# Patient Record
Sex: FEMALE | Race: WHITE | NOT HISPANIC OR LATINO | Employment: OTHER | URBAN - METROPOLITAN AREA
[De-identification: names, ages, dates, MRNs, and addresses within clinical notes are randomized per-mention and may not be internally consistent; named-entity substitution may affect disease eponyms.]

---

## 2019-11-05 ENCOUNTER — TELEPHONE (OUTPATIENT)
Dept: UROLOGY | Facility: CLINIC | Age: 84
End: 2019-11-05

## 2019-11-05 NOTE — TELEPHONE ENCOUNTER
----- Message from Joycelyn Suh sent at 11/4/2019 10:10 AM CST -----  Contact: pt  Type:  Sooner Apoointment Request    Caller is requesting a sooner appointment.  Caller declined first available appointment listed below.  Caller will not accept being placed on the waitlist and is requesting a message be sent to doctor.  Name of Caller: ROLO WILLIS   When is the first available appointment? 11/21/2019  Symptoms: bladder  Would the patient rather a call back or a response via [facility]? Call  Best Call Back Number:[phone] (home) or [phone]  Additional Information: Pt would like a sooner appt. Before 11/21/2019. Please call back [phone]

## 2019-11-07 ENCOUNTER — OFFICE VISIT (OUTPATIENT)
Dept: UROLOGY | Facility: CLINIC | Age: 84
End: 2019-11-07
Payer: MEDICARE

## 2019-11-07 VITALS
RESPIRATION RATE: 18 BRPM | HEIGHT: 63 IN | DIASTOLIC BLOOD PRESSURE: 75 MMHG | HEART RATE: 82 BPM | SYSTOLIC BLOOD PRESSURE: 111 MMHG | BODY MASS INDEX: 32.25 KG/M2 | WEIGHT: 182 LBS

## 2019-11-07 DIAGNOSIS — N39.0 RECURRENT UTI (URINARY TRACT INFECTION): ICD-10-CM

## 2019-11-07 DIAGNOSIS — R10.2 VAGINAL PAIN: Primary | ICD-10-CM

## 2019-11-07 LAB
BILIRUB UR QL STRIP: NEGATIVE
GLUCOSE UR QL STRIP: NEGATIVE
KETONES UR QL STRIP: NEGATIVE
LEUKOCYTE ESTERASE UR QL STRIP: POSITIVE
PH, POC UA: 6.5
POC AMORP, URINE: ABNORMAL
POC BACTI, URINE: ABNORMAL
POC BLOOD, URINE: POSITIVE
POC CASTS, URINE: ABNORMAL
POC CRYST, URINE: ABNORMAL
POC EPITH, URINE: ABNORMAL
POC HCG, URINE: ABNORMAL
POC HYALIN, URINE: ABNORMAL LPF
POC MUCUS, URINE: ABNORMAL
POC NITRATES, URINE: POSITIVE
POC OTHER, URINE: ABNORMAL
POC RBC, URINE: ABNORMAL HPF
POC WBC, URINE: >100 HPF
PROT UR QL STRIP: POSITIVE
SP GR UR STRIP: >=1.03 (ref 1–1.03)
UROBILINOGEN UR STRIP-ACNC: 2 (ref 0.1–1.1)

## 2019-11-07 PROCEDURE — 81000 POCT URINALYSIS (W/MICRO OPTION): ICD-10-PCS | Mod: S$GLB,,, | Performed by: SPECIALIST

## 2019-11-07 PROCEDURE — 81000 URINALYSIS NONAUTO W/SCOPE: CPT | Mod: S$GLB,,, | Performed by: SPECIALIST

## 2019-11-07 PROCEDURE — 1101F PR PT FALLS ASSESS DOC 0-1 FALLS W/OUT INJ PAST YR: ICD-10-PCS | Mod: CPTII,S$GLB,, | Performed by: SPECIALIST

## 2019-11-07 PROCEDURE — 99204 PR OFFICE/OUTPT VISIT, NEW, LEVL IV, 45-59 MIN: ICD-10-PCS | Mod: 25,S$GLB,, | Performed by: SPECIALIST

## 2019-11-07 PROCEDURE — 99204 OFFICE O/P NEW MOD 45 MIN: CPT | Mod: 25,S$GLB,, | Performed by: SPECIALIST

## 2019-11-07 PROCEDURE — 1101F PT FALLS ASSESS-DOCD LE1/YR: CPT | Mod: CPTII,S$GLB,, | Performed by: SPECIALIST

## 2019-11-07 RX ORDER — ACARBOSE 25 MG/1
25 TABLET ORAL
COMMUNITY

## 2019-11-07 RX ORDER — CEPHALEXIN 250 MG/1
250 CAPSULE ORAL 4 TIMES DAILY
COMMUNITY
End: 2020-05-15

## 2019-11-07 RX ORDER — SERTRALINE HYDROCHLORIDE 25 MG/1
TABLET, FILM COATED ORAL
COMMUNITY
Start: 2019-10-05

## 2019-11-07 RX ORDER — METOPROLOL TARTRATE 100 MG/1
TABLET ORAL
COMMUNITY
Start: 2019-10-07 | End: 2020-05-15

## 2019-11-07 RX ORDER — VIT C/E/ZN/COPPR/LUTEIN/ZEAXAN 250MG-90MG
1000 CAPSULE ORAL DAILY
COMMUNITY

## 2019-11-07 RX ORDER — LEVETIRACETAM 500 MG/1
TABLET ORAL
COMMUNITY
Start: 2019-09-12

## 2019-11-07 RX ORDER — APIXABAN 2.5 MG/1
TABLET, FILM COATED ORAL
COMMUNITY
Start: 2019-11-04

## 2019-11-07 NOTE — PROGRESS NOTES
Subjective:       Patient ID: Cherie Hunt is a 84 y.o. female.    Chief Complaint: Other (bladder pain/spasm); Urinary Incontinence (cant hold urine); and Hematuria (can see blood in urine )      HPI:  84-year-old female referred to us for lower urinary tract symptoms.    Patient is reporting lot of pain in the vaginal area she says it hurts irrespective of whether she is voiding or not.  She denies any suprapubic pain.  Associated symptoms include inability to hold urine or urgency incontinence she has nocturia multiple times daily frequency more than 8 times.    She is also reporting that she has seen blood in her urine she recently was imaged in a hospital in [facility].  I am not sure what modality of imaging was obtained.     Upon catheterization today there was p.o. yellowish purulent urine draining from the bladder this is very concerning to me.    She denies any fevers or chills.    Past Medical History:   Past Medical History:   Diagnosis Date    A-fib     Hypertension     Hypothyroidism     Seizures        Past Surgical Historical:   Past Surgical History:   Procedure Laterality Date    BLADDER SURGERY      bladder tact X2    HERNIA REPAIR      TONSILLECTOMY          Medications:   Medication List with Changes/Refills   Current Medications    ACARBOSE (PRECOSE) 25 MG TAB    Take 25 mg by mouth 3 (three) times daily with meals.    CEPHALEXIN (KEFLEX) 250 MG CAPSULE    Take 250 mg by mouth 4 (four) times daily.    CHOLECALCIFEROL, VITAMIN D3, (VITAMIN D3) 1,000 UNIT CAPSULE    Take 1,000 Units by mouth once daily.    ELIQUIS 2.5 MG TAB        LEVETIRACETAM (KEPPRA) 500 MG TAB        METOPROLOL TARTRATE (LOPRESSOR) 100 MG TABLET        SERTRALINE (ZOLOFT) 25 MG TABLET            Past Social History:   Social History     Socioeconomic History    Marital status:      Spouse name: Not on file    Number of children: Not on file    Years of education: Not on file    Highest education level: Not  on file   Occupational History    Not on file   Social Needs    Financial resource strain: Not on file    Food insecurity:     Worry: Not on file     Inability: Not on file    Transportation needs:     Medical: Not on file     Non-medical: Not on file   Tobacco Use    Smoking status: Never Smoker    Smokeless tobacco: Never Used   Substance and Sexual Activity    Alcohol use: Not Currently    Drug use: Never    Sexual activity: Not on file   Lifestyle    Physical activity:     Days per week: Not on file     Minutes per session: Not on file    Stress: Not on file   Relationships    Social connections:     Talks on phone: Not on file     Gets together: Not on file     Attends Buddhist service: Not on file     Active member of club or organization: Not on file     Attends meetings of clubs or organizations: Not on file     Relationship status: Not on file   Other Topics Concern    Not on file   Social History Narrative    Not on file       Allergies: Review of patient's allergies indicates:  No Known Allergies     Family History:   Family History   Problem Relation Age of Onset    Seizures Mother     Cancer Other     Breast cancer Other         Review of Systems:  Review of Systems - General ROS: negative  Psychological ROS: negative  Ophthalmic ROS: negative  ENT ROS: negative  Allergy and Immunology ROS: negative  Hematological and Lymphatic ROS: negative  Endocrine ROS: negative  Respiratory ROS: no cough, shortness of breath, or wheezing  Cardiovascular ROS: no chest pain or dyspnea on exertion  Gastrointestinal ROS: no abdominal pain, change in bowel habits, or black or bloody stools  Genito-Urinary ROS: positive for - LUTS  Musculoskeletal ROS: negative  Neurological ROS: no TIA or stroke symptoms  Dermatological ROS: negative     Physical Exam:  General Appearance:    Alert, cooperative, no distress, appears stated age   Head:    Normocephalic, without obvious abnormality, atraumatic   Eyes:     PERRL, conjunctiva/corneas clear, EOM's intact, fundi     benign, both eyes   Ears:    Normal TM's and external ear canals, both ears   Nose:   Nares normal, septum midline, mucosa normal, no drainage    or sinus tenderness   Throat:   Lips, mucosa, and tongue normal; teeth and gums normal   Neck:   Supple, symmetrical, trachea midline, no adenopathy;     thyroid:  no enlargement/tenderness/nodules; no carotid    bruit or JVD   Back:     Symmetric, no curvature, ROM normal, no CVA tenderness   Lungs:     Clear to auscultation bilaterally, respirations unlabored   Chest Wall:    No tenderness or deformity    Heart:    Regular rate and rhythm, S1 and S2 normal, no murmur, rub   or gallop   Breast Exam:    No tenderness, masses, or nipple abnormality   Abdomen:     Soft, non-tender, bowel sounds active all four quadrants,     no masses, no organomegaly   Genitalia:    Normal female without lesion, discharge or tenderness    Pelvic exam revealed Richard vaginal mucosa no discharge very narrowed introitus.  No external lesions on external genitalia no lesions on the anterior vaginal wall immediate catheterization revealed yellowish purulent urine.   Rectal:    deferred   Extremities:   Extremities normal, atraumatic, no cyanosis or edema   Pulses:   2+ and symmetric all extremities   Skin:   Skin color, texture, turgor normal, no rashes or lesions   Lymph nodes:   Cervical, supraclavicular, and axillary nodes normal   Neurologic:   CNII-XII intact, normal strength, sensation and reflexes     throughout         Assessment/Plan:       84-year-old female with abnormal findings on physical exam suggestive of a bladder process.    1.  Culture urine today and treat accordingly  2.  Give a trial of either peridium all uribel for few days for  3.  Schedule her for office cystoscopy   4.  Her the daughter will  the disc from the outside facility and bring it to me for my review.    Problem List Items Addressed This Visit         Renal/    Recurrent UTI (urinary tract infection)    Relevant Orders    POCT Urinalysis (w/Micro Option) (Completed)    Urine culture       GI    Vaginal pain - Primary    Relevant Orders    Urine culture

## 2019-11-08 ENCOUNTER — TELEPHONE (OUTPATIENT)
Dept: UROLOGY | Facility: CLINIC | Age: 84
End: 2019-11-08

## 2019-11-08 NOTE — TELEPHONE ENCOUNTER
----- Message from Beverley Ernst sent at 11/8/2019  1:55 PM CST -----  Contact: [facility] records dept  duplicate message regarding what exact records are needed....[phone]

## 2019-11-08 NOTE — TELEPHONE ENCOUNTER
Spoke with pt Dr. Nava is calling in RX to pharmacy in [place]. Also informed pt she is supposed to  disk from [facility] and she understood and said she would get it and drop off during business hours. HM

## 2019-11-11 RX ORDER — SULFAMETHOXAZOLE AND TRIMETHOPRIM 800; 160 MG/1; MG/1
1 TABLET ORAL 2 TIMES DAILY
Qty: 14 TABLET | Refills: 0 | Status: SHIPPED | OUTPATIENT
Start: 2019-11-11 | End: 2020-05-15

## 2019-11-12 ENCOUNTER — TELEPHONE (OUTPATIENT)
Dept: UROLOGY | Facility: CLINIC | Age: 84
End: 2019-11-12

## 2019-11-14 ENCOUNTER — TELEPHONE (OUTPATIENT)
Dept: UROLOGY | Facility: CLINIC | Age: 84
End: 2019-11-14

## 2019-12-02 ENCOUNTER — PROCEDURE VISIT (OUTPATIENT)
Dept: UROLOGY | Facility: CLINIC | Age: 84
End: 2019-12-02
Payer: MEDICARE

## 2019-12-02 VITALS
OXYGEN SATURATION: 98 % | DIASTOLIC BLOOD PRESSURE: 82 MMHG | SYSTOLIC BLOOD PRESSURE: 126 MMHG | RESPIRATION RATE: 16 BRPM | WEIGHT: 187 LBS | HEIGHT: 63 IN | HEART RATE: 74 BPM | BODY MASS INDEX: 33.13 KG/M2

## 2019-12-02 DIAGNOSIS — R31.0 HEMATURIA, GROSS: Primary | ICD-10-CM

## 2019-12-02 DIAGNOSIS — N39.0 RECURRENT UTI (URINARY TRACT INFECTION): ICD-10-CM

## 2019-12-02 LAB
ANION GAP SERPL CALC-SCNC: 4 MMOL/L (ref 3–11)
BUN SERPL-MCNC: 22 MG/DL (ref 7–18)
CALCIUM SERPL-MCNC: 9.2 MG/DL (ref 8.5–10.1)
CHLORIDE SERPL-SCNC: 106 MMOL/L (ref 98–107)
CO2 SERPL-SCNC: 30 MMOL/L (ref 21–32)
CREAT SERPL-MCNC: 0.98 MG/DL (ref 0.55–1.02)
GFR ESTIMATION: 54
GLUCOSE SERPL-MCNC: 98 MG/DL (ref 74–106)
POTASSIUM SERPL-SCNC: 4.1 MMOL/L (ref 3.5–5.1)
SODIUM BLD-SCNC: 140 MMOL/L (ref 131–143)

## 2019-12-02 PROCEDURE — 52000 CYSTOSCOPY: ICD-10-PCS | Mod: S$GLB,,, | Performed by: SPECIALIST

## 2019-12-02 PROCEDURE — 52000 CYSTOURETHROSCOPY: CPT | Mod: S$GLB,,, | Performed by: SPECIALIST

## 2019-12-02 RX ORDER — CIPROFLOXACIN 500 MG/1
500 TABLET ORAL
Status: COMPLETED | OUTPATIENT
Start: 2019-12-02 | End: 2019-12-02

## 2019-12-02 RX ORDER — ESTRADIOL 0.1 MG/G
CREAM VAGINAL
Qty: 54 G | Refills: 0 | Status: SHIPPED | OUTPATIENT
Start: 2019-12-02 | End: 2020-03-01

## 2019-12-02 RX ADMIN — CIPROFLOXACIN 500 MG: 500 TABLET ORAL at 10:12

## 2019-12-02 NOTE — PATIENT INSTRUCTIONS
Cystoscopy    Cystoscopy is a procedure that lets your doctor look directly inside your urethra and bladder. It can be used to:  · Help diagnose a problem with your urethra, bladder, or kidneys.  · Take a sample (biopsy) of bladder or urethral tissue.  · Treat certain problems (such as removing kidney stones).  · Place a stent to bypass an obstruction.  · Take special X-rays of the kidneys.  Based on the findings, your doctor may recommend other tests or treatments.  What is a cystoscope?  A cystoscope is a telescope-like instrument that contains lenses and fiberoptics (small glass wires that make bright light). The cystoscope may be straight and rigid, or flexible to bend around curves in the urethra. The doctor may look directly into the cystoscope, or project the image onto a monitor.  Getting ready  · Ask your doctor if you should stop taking any medicines before the procedure.  · Ask whether you should avoid eating or drinking anything after midnight before the procedure.  · Follow any other instructions your doctor gives you.  Tell your doctor before the exam if you:  · Take any medicines, such as aspirin or blood thinners  · Have allergies to any medicines  · Are pregnant   The procedure  Cystoscopy is done in the doctors office, surgery center, or hospital. The doctor and a nurse are present during the procedure. It takes only a few minutes, longer if a biopsy, X-ray, or treatment needs to be done.  During the procedure:  · You lie on an exam table on your back, knees bent and legs apart. You are covered with a drape.  · Your urethra and the area around it are washed. Anesthetic jelly may be applied to numb the urethra. Other pain medicine is usually not needed. In some cases, you may be offered a mild sedative to help you relax. If a more extensive procedure is to be done, such as a biopsy or kidney stone removal, general anesthesia may be needed.  · The cystoscope is inserted. A sterile fluid is put  into the bladder to expand it. You may feel pressure from this fluid.  · When the procedure is done, the cystoscope is removed.  After the procedure  If you had a sedative, general anesthesia, or spinal anesthesia, you must have someone drive you home. Once youre home:  · Drink plenty of fluids.  · You may have burning or light bleeding when you urinate--this is normal.  · Medicines may be prescribed to ease any discomfort or prevent infection. Take these as directed.  · Call your doctor if you have heavy bleeding or blood clots, burning that lasts more than a day, a fever over 100°F  (38° C), or trouble urinating.  Date Last Reviewed: 1/1/2017  © 5771-1955 The "[employer]. [address] [place] [address], [place] [address]. All rights reserved. This information is not intended as a substitute for professional medical care. Always follow your healthcare professional's instructions.

## 2019-12-03 NOTE — PROCEDURES
"Cystoscopy  Date/Time: 12/2/2019 9:20 AM  Performed by: Christ Nava MD  Authorized by: Christ Nava MD     Consent Done?:  Yes (Written)  Time out: Immediately prior to procedure a "time out" was called to verify the correct patient, procedure, equipment, support staff and site/side marked as required.    Position:  Dorsal lithotomy  Anesthesia:  Intraurethral instillation  Preparation: Patient was prepped and draped in usual sterile fashion      Scope type:  Rigid cystoscope  External exam performed: External genitalia consistent with age high suspicion of atrophic vaginitis.    Urethra normal: Yes  Bladder neck normal: Bladder neck normal   Normal bladder: There was some bloody discharge identified at the trigonal area I think it was very related to the right ureteral orifice.        Patient tolerance:  Patient tolerated the procedure well with no immediate complications     There was effluxing of sarah reddish urine from the right ureteral orifice.  She will need upper tract imaging with IV contrast.  So for today we will obtain a BMP p.m. in the BMPs adequate and in terms of her renal function it which are order a CT urogram.      I am going to start her on estrogen vaginal therapy and Urimar T as needed for dysuria.  Patient did respond very well to the Uribel that I gave her at the last visit.      "

## 2020-02-21 ENCOUNTER — OFFICE VISIT (OUTPATIENT)
Dept: UROLOGY | Facility: CLINIC | Age: 85
End: 2020-02-21
Payer: MEDICARE

## 2020-02-21 VITALS
RESPIRATION RATE: 17 BRPM | WEIGHT: 187 LBS | BODY MASS INDEX: 33.13 KG/M2 | HEART RATE: 87 BPM | SYSTOLIC BLOOD PRESSURE: 132 MMHG | DIASTOLIC BLOOD PRESSURE: 87 MMHG | HEIGHT: 63 IN

## 2020-02-21 DIAGNOSIS — N95.2 ATROPHIC VAGINITIS: ICD-10-CM

## 2020-02-21 DIAGNOSIS — R30.0 DYSURIA: ICD-10-CM

## 2020-02-21 DIAGNOSIS — R31.0 GROSS HEMATURIA: Primary | ICD-10-CM

## 2020-02-21 LAB
BILIRUB UR QL STRIP: NEGATIVE
GLUCOSE UR QL STRIP: NEGATIVE
KETONES UR QL STRIP: NEGATIVE
LEUKOCYTE ESTERASE UR QL STRIP: POSITIVE
PH, POC UA: 5.5
POC AMORP, URINE: 0
POC BACTI, URINE: ABNORMAL
POC BLOOD, URINE: POSITIVE
POC CASTS, URINE: 0
POC CRYST, URINE: 0
POC EPITH, URINE: ABNORMAL
POC HCG, URINE: ABNORMAL
POC HYALIN, URINE: 0 LPF
POC MUCUS, URINE: 0
POC NITRATES, URINE: NEGATIVE
POC OTHER, URINE: 0
POC RBC, URINE: ABNORMAL HPF
POC WBC, URINE: ABNORMAL HPF
PROT UR QL STRIP: POSITIVE
SP GR UR STRIP: 1.02 (ref 1–1.03)
UROBILINOGEN UR STRIP-ACNC: 0.2 (ref 0.1–1.1)

## 2020-02-21 PROCEDURE — 1126F PR PAIN SEVERITY QUANTIFIED, NO PAIN PRESENT: ICD-10-PCS | Mod: S$GLB,,, | Performed by: NURSE PRACTITIONER

## 2020-02-21 PROCEDURE — 81001 PR  URINALYSIS, AUTO, W/SCOPE: ICD-10-PCS | Mod: S$GLB,,, | Performed by: NURSE PRACTITIONER

## 2020-02-21 PROCEDURE — 99213 PR OFFICE/OUTPT VISIT, EST, LEVL III, 20-29 MIN: ICD-10-PCS | Mod: 25,S$GLB,, | Performed by: NURSE PRACTITIONER

## 2020-02-21 PROCEDURE — 99213 OFFICE O/P EST LOW 20 MIN: CPT | Mod: 25,S$GLB,, | Performed by: NURSE PRACTITIONER

## 2020-02-21 PROCEDURE — 1126F AMNT PAIN NOTED NONE PRSNT: CPT | Mod: S$GLB,,, | Performed by: NURSE PRACTITIONER

## 2020-02-21 PROCEDURE — 51798 PR MEAS,POST-VOID RES,US,NON-IMAGING: ICD-10-PCS | Mod: S$GLB,,, | Performed by: NURSE PRACTITIONER

## 2020-02-21 PROCEDURE — 1101F PR PT FALLS ASSESS DOC 0-1 FALLS W/OUT INJ PAST YR: ICD-10-PCS | Mod: CPTII,S$GLB,, | Performed by: NURSE PRACTITIONER

## 2020-02-21 PROCEDURE — 1159F PR MEDICATION LIST DOCUMENTED IN MEDICAL RECORD: ICD-10-PCS | Mod: S$GLB,,, | Performed by: NURSE PRACTITIONER

## 2020-02-21 PROCEDURE — 1101F PT FALLS ASSESS-DOCD LE1/YR: CPT | Mod: CPTII,S$GLB,, | Performed by: NURSE PRACTITIONER

## 2020-02-21 PROCEDURE — 1159F MED LIST DOCD IN RCRD: CPT | Mod: S$GLB,,, | Performed by: NURSE PRACTITIONER

## 2020-02-21 PROCEDURE — 51798 US URINE CAPACITY MEASURE: CPT | Mod: S$GLB,,, | Performed by: NURSE PRACTITIONER

## 2020-02-21 PROCEDURE — 81001 URINALYSIS AUTO W/SCOPE: CPT | Mod: S$GLB,,, | Performed by: NURSE PRACTITIONER

## 2020-02-21 RX ORDER — METOPROLOL TARTRATE 50 MG/1
TABLET ORAL
COMMUNITY
Start: 2020-02-06

## 2020-02-21 RX ORDER — DILTIAZEM HYDROCHLORIDE 240 MG/1
CAPSULE, COATED, EXTENDED RELEASE ORAL
COMMUNITY
Start: 2020-01-23

## 2020-02-21 RX ORDER — TRAMADOL HYDROCHLORIDE 50 MG/1
TABLET ORAL
COMMUNITY
Start: 2019-12-03 | End: 2020-05-15

## 2020-02-21 NOTE — PROGRESS NOTES
Chief Complaint:   Chief Complaint   Patient presents with    Follow-up     cysto f/u       HPI:  84 year old  female known to Dr. Nava who presents for follow up.  She was complaining of vaginal pain with inability to hold urine with urgency incontinence, daytime frequency more than 8 times, and nocturia.  She was also having episodes of gross hematuria.  She underwent cystoscopy on 12/2/19 revealing external genitalia findings associated with atrophic vaginitis, which is likely the cause of her pain and multiple diagnoses of UTI.  She was prescribed estrogen vaginal therapy and Urimar-T after procedure, denies any more symptoms today.  She feels like she empties her bladder completely.  There was also sarah reddish urine from the right ureteral orifice so plans were to order CT urogram for further evaluation. She continues to note blood in her urine.      Allergies:  Patient has no known allergies.    Medications: has a current medication list which includes the following prescription(s): acarbose, cephalexin, cholecalciferol (vitamin d3), diltiazem, eliquis, estradiol, levetiracetam, xgytos-lunsr-p.blue-sal-naphos, xamhwl-pocov-y.blue-sal-naphos, metoprolol tartrate, metoprolol tartrate, sertraline, sulfamethoxazole-trimethoprim 800-160mg, and tramadol.    Review of Systems:  General: No fever, chills, vision changes, dizziness, weakness, fatigue, unexplained weight loss, confusion, or mood swings.  Skin: No rashes, itching, or changes in color/texture of skin.  Chest: Denies LLOYD, cyanosis, wheezing, cough, and sputum production.  Abdomen: Appetite fine. Denies diarrhea, abdominal pain, hematemesis, or blood in stool.  Musculoskeletal: No joint stiffness or swelling. Denies back pain.  : As above.  All other review of systems negative.    PMH:   has a past medical history of A-fib, Hypertension, Hypothyroidism, and Seizures.    PSH:   has a past surgical history that includes Hernia repair;  Tonsillectomy; and Bladder surgery.    FamHx: family history includes Breast cancer in her other; Cancer in her other; Seizures in her mother.    SocHx:  reports that she has never smoked. She has never used smokeless tobacco. She reports that she drank alcohol. She reports that she does not use drugs.      Physical Exam:  Vitals:    02/21/20 0955   BP: 132/87   Pulse: 87   Resp: 17     General: AAOx3, no apparent distress, no deformities  Neck: supple, no masses, normal thyroid  Lungs: normal inspiration, no use of accessory muscles  Heart: regular rate and rhythm, no arrhythmias  Abdomen: soft, NT, ND, no masses, no hernias, no hepatosplenomegaly  Lymphatic: no unusually enlarged or tender lymph nodes  Skin: warm and dry, no jaundice.  Ext: without edema or deformity.  : deferred    Labs/Studies: UA reveals WBCs 25-50/hpf, RBCs 50-75/hpf, epi 4+, bact 2+ (asymptomatic); bladder scan 257mL, repeat after double void 158mL    Labs reviewed from outside facility showing GFR 58.30 on 2/14/2020    Impression/Plan:   Gross hematuria  Comments:  as noted during cysto and RBCs noted on UA, plan for CT urogram for complete workup   Orders:  -     POCT Urinalysis (w/Micro Option)  -     POCT Bladder Scan  -     CT Urogram Abd Pelvis W WO; Future; Expected date: 02/21/2020    Dysuria  Comments:  improved, continue Urimar-T PRN, denies any symptoms today, UA shows WBCs and bact but shows 4+ epi so likely a contaminated specimen    Atrophic vaginitis  Comments:  using vaginal estrogen cream as prescribed after cysto        Follow up in about 4 weeks (around 3/20/2020) for CT scan results.

## 2020-02-26 DIAGNOSIS — N20.0 NEPHROLITHIASIS: Primary | ICD-10-CM

## 2020-03-02 ENCOUNTER — TELEPHONE (OUTPATIENT)
Dept: UROLOGY | Facility: CLINIC | Age: 85
End: 2020-03-02

## 2020-03-02 NOTE — TELEPHONE ENCOUNTER
Spoke with patient and notified of CT findings, she will think about possible surgical intervention on stone and let us know what she decides

## 2020-03-02 NOTE — TELEPHONE ENCOUNTER
----- Message from Anika Wilkerson sent at 3/2/2020 10:32 AM CST -----  Contact: pt   Type:  Patient Returning Call    Who Called:pt   Who Left Message for Patient:na  Does the patient know what this is regarding?:na  Would the patient rather a call back or a response via [facility]? Call back   Best Call Back Number:.[phone]   Additional Information:

## 2020-03-02 NOTE — TELEPHONE ENCOUNTER
Attempted to call patient to discuss CT scan results as reviewed by Dr. Nava, left VM to call back, let me know if she calls back please

## 2020-03-02 NOTE — TELEPHONE ENCOUNTER
Attempted to call patient back, the [phone] number is a business number, and I left message on the [phone] number as it said her name on the voicemail message

## 2020-03-03 ENCOUNTER — TELEPHONE (OUTPATIENT)
Dept: UROLOGY | Facility: CLINIC | Age: 85
End: 2020-03-03

## 2020-03-03 NOTE — TELEPHONE ENCOUNTER
----- Message from Tima Duque sent at 3/3/2020  1:46 PM CST -----  Contact: Pt  Please call Cherie regarding her kidney stone [phone].

## 2020-03-03 NOTE — TELEPHONE ENCOUNTER
Spoke with patient, wants to proceed with intervention on stone, will forward chart to Dr. Nava as we have discussed this patient recently.

## 2020-03-06 ENCOUNTER — CLINICAL SUPPORT (OUTPATIENT)
Dept: UROLOGY | Facility: CLINIC | Age: 85
End: 2020-03-06
Payer: MEDICARE

## 2020-03-06 DIAGNOSIS — N20.0 NEPHROLITHIASIS: Primary | ICD-10-CM

## 2020-03-06 LAB
APPEARANCE, UA: CLEAR
BACTERIA SPEC CULT: ABNORMAL /HPF
BASOPHILS NFR SNV MANUAL: 0.5 % (ref 0–3)
BILIRUB UR QL STRIP: NEGATIVE MG/DL
BUN SERPL-MCNC: 19 MG/DL (ref 7–18)
BUN/CREAT SERPL: 17.75 RATIO (ref 7–18)
CALCIUM SERPL-MCNC: 9.5 MG/DL (ref 8.8–10.5)
CHLORIDE SERPL-SCNC: 105 MMOL/L (ref 100–108)
CO2 SERPL-SCNC: 29 MMOL/L (ref 21–32)
COLOR UR: ABNORMAL
CREAT SERPL-MCNC: 1.07 MG/DL (ref 0.55–1.02)
EOSINOPHIL NFR SNV MANUAL: 1.4 % (ref 1–3)
ERYTHROCYTE [DISTWIDTH] IN BLOOD BY AUTOMATED COUNT: 12.3 % (ref 12.5–18)
GFR ESTIMATION: 49
GLUCOSE (UA): NORMAL MG/DL
GLUCOSE SERPL-MCNC: 106 MG/DL (ref 70–110)
HCT VFR BLD AUTO: 46.7 % (ref 37–47)
HGB BLD-MCNC: 14.9 G/DL (ref 12–16)
HGB UR QL STRIP: 50 /UL
KETONES UR QL STRIP: NEGATIVE MG/DL
LEUKOCYTE ESTERASE UR QL STRIP: 25 /UL
LYMPHOCYTES NFR SNV MANUAL: 22.6 % (ref 25–40)
MANUAL NRBC PER 100 CELLS: 0 %
MCH RBC QN AUTO: 30.5 PG (ref 27–31.2)
MCHC RBC AUTO-ENTMCNC: 31.9 G/DL (ref 31.8–35.4)
MCV RBC AUTO: 95.7 FL (ref 80–97)
MONOCYTES/100 LEUKOCYTES: 4.7 % (ref 1–15)
NEUTROPHILS NFR BLD: 4.65 10*3/UL (ref 1.8–7.7)
NEUTROPHILS NFR SNV MANUAL: 70.5 % (ref 37–80)
NITRITE UR QL STRIP: NEGATIVE
PH UR STRIP: 7 PH (ref 5–9)
PLATELETS: 250 10*3/UL (ref 142–424)
POTASSIUM SERPL-SCNC: 4.7 MMOL/L (ref 3.6–5.2)
PROT UR QL STRIP: NEGATIVE MG/DL
RBC # BLD AUTO: 4.88 10*6/UL (ref 4.2–5.4)
RBC #/AREA URNS HPF: ABNORMAL /HPF (ref 0–2)
SERVICE COMMENT 03: ABNORMAL
SODIUM BLD-SCNC: 142 MMOL/L (ref 135–145)
SP GR UR STRIP: 1.01 (ref 1–1.03)
SPECIMEN COLLECTION METHOD, URINE: ABNORMAL
SQUAMOUS EPITHELIAL, UA: ABNORMAL /LPF
UROBILINOGEN UR STRIP-ACNC: NORMAL MG/DL
WBC # BLD: 6.6 10*3/UL (ref 4.6–10.2)
WBC #/AREA URNS HPF: ABNORMAL /HPF (ref 0–5)

## 2020-03-10 ENCOUNTER — OUTSIDE PLACE OF SERVICE (OUTPATIENT)
Dept: UROLOGY | Facility: CLINIC | Age: 85
End: 2020-03-10
Payer: MEDICARE

## 2020-03-10 LAB — GLUCOSE SERPL-MCNC: 90 MG/DL (ref 70–105)

## 2020-03-10 PROCEDURE — 74420 PR  X-RAY RETROGRADE PYELOGRAM: ICD-10-PCS | Mod: 26,,, | Performed by: SPECIALIST

## 2020-03-10 PROCEDURE — 74420 UROGRAPHY RTRGR +-KUB: CPT | Mod: 26,,, | Performed by: SPECIALIST

## 2020-03-10 PROCEDURE — 52356 PR CYSTO/URETERO W/LITHOTRIPSY: ICD-10-PCS | Mod: RT,,, | Performed by: SPECIALIST

## 2020-03-10 PROCEDURE — 52356 CYSTO/URETERO W/LITHOTRIPSY: CPT | Mod: RT,,, | Performed by: SPECIALIST

## 2020-03-15 LAB
CALCULI COMPOSITION: NORMAL
CALCULI DESCRIPTION: NORMAL
CALCULI MASS: 45 MG
CALCULI NUMBER: NORMAL
CALCULI PDF: NORMAL
CALCULI SIZE: NORMAL MM

## 2020-03-16 ENCOUNTER — PROCEDURE VISIT (OUTPATIENT)
Dept: UROLOGY | Facility: CLINIC | Age: 85
End: 2020-03-16
Payer: MEDICARE

## 2020-03-16 ENCOUNTER — HOSPITAL ENCOUNTER (OUTPATIENT)
Dept: RADIOLOGY | Facility: CLINIC | Age: 85
Discharge: HOME | End: 2020-03-16
Attending: SPECIALIST
Payer: MEDICARE

## 2020-03-16 VITALS
HEART RATE: 117 BPM | SYSTOLIC BLOOD PRESSURE: 120 MMHG | OXYGEN SATURATION: 96 % | HEIGHT: 63 IN | WEIGHT: 186 LBS | BODY MASS INDEX: 32.96 KG/M2 | DIASTOLIC BLOOD PRESSURE: 82 MMHG

## 2020-03-16 DIAGNOSIS — Z46.6 ENCOUNTER FOR REMOVAL OF URETERAL STENT: Primary | ICD-10-CM

## 2020-03-16 DIAGNOSIS — N20.0 NEPHROLITHIASIS: ICD-10-CM

## 2020-03-16 LAB
BILIRUB UR QL STRIP: POSITIVE
GLUCOSE UR QL STRIP: NEGATIVE
KETONES UR QL STRIP: POSITIVE
LEUKOCYTE ESTERASE UR QL STRIP: POSITIVE
PH, POC UA: 6.5
POC AMORP, URINE: ABNORMAL
POC BACTI, URINE: ABNORMAL
POC BLOOD, URINE: POSITIVE
POC CASTS, URINE: ABNORMAL
POC CRYST, URINE: ABNORMAL
POC EPITH, URINE: ABNORMAL
POC HCG, URINE: ABNORMAL
POC HYALIN, URINE: ABNORMAL LPF
POC MUCUS, URINE: NEGATIVE
POC NITRATES, URINE: POSITIVE
POC OTHER, URINE: ABNORMAL
POC RBC, URINE: ABNORMAL HPF
POC WBC, URINE: ABNORMAL HPF
PROT UR QL STRIP: POSITIVE
SP GR UR STRIP: 1.02 (ref 1–1.03)
UROBILINOGEN UR STRIP-ACNC: 2 (ref 0.1–1.1)

## 2020-03-16 PROCEDURE — 52310 CYSTOSCOPY AND TREATMENT: CPT | Mod: S$GLB,,, | Performed by: SPECIALIST

## 2020-03-16 PROCEDURE — 74018 XR ABDOMEN AP 1 VIEW: ICD-10-PCS | Mod: TC,,, | Performed by: SPECIALIST

## 2020-03-16 PROCEDURE — 74018 RADEX ABDOMEN 1 VIEW: CPT | Mod: TC,,, | Performed by: SPECIALIST

## 2020-03-16 PROCEDURE — 74018 XR ABDOMEN AP 1 VIEW: ICD-10-PCS | Mod: 26,,, | Performed by: RADIOLOGY

## 2020-03-16 PROCEDURE — 74018 RADEX ABDOMEN 1 VIEW: CPT | Mod: 26,,, | Performed by: RADIOLOGY

## 2020-03-16 PROCEDURE — 52310 CYSTOSCOPY: ICD-10-PCS | Mod: S$GLB,,, | Performed by: SPECIALIST

## 2020-03-16 RX ORDER — CIPROFLOXACIN 500 MG/1
500 TABLET ORAL
Status: COMPLETED | OUTPATIENT
Start: 2020-03-16 | End: 2020-03-16

## 2020-03-16 RX ADMIN — CIPROFLOXACIN 500 MG: 500 TABLET ORAL at 10:03

## 2020-03-16 NOTE — PATIENT INSTRUCTIONS
Cystoscopy    Cystoscopy is a procedure that lets your doctor look directly inside your urethra and bladder. It can be used to:  · Help diagnose a problem with your urethra, bladder, or kidneys.  · Take a sample (biopsy) of bladder or urethral tissue.  · Treat certain problems (such as removing kidney stones).  · Place a stent to bypass an obstruction.  · Take special X-rays of the kidneys.  Based on the findings, your doctor may recommend other tests or treatments.  What is a cystoscope?  A cystoscope is a telescope-like instrument that contains lenses and fiberoptics (small glass wires that make bright light). The cystoscope may be straight and rigid, or flexible to bend around curves in the urethra. The doctor may look directly into the cystoscope, or project the image onto a monitor.  Getting ready  · Ask your doctor if you should stop taking any medicines before the procedure.  · Ask whether you should avoid eating or drinking anything after midnight before the procedure.  · Follow any other instructions your doctor gives you.  Tell your doctor before the exam if you:  · Take any medicines, such as aspirin or blood thinners  · Have allergies to any medicines  · Are pregnant   The procedure  Cystoscopy is done in the doctors office, surgery center, or hospital. The doctor and a nurse are present during the procedure. It takes only a few minutes, longer if a biopsy, X-ray, or treatment needs to be done.  During the procedure:  · You lie on an exam table on your back, knees bent and legs apart. You are covered with a drape.  · Your urethra and the area around it are washed. Anesthetic jelly may be applied to numb the urethra. Other pain medicine is usually not needed. In some cases, you may be offered a mild sedative to help you relax. If a more extensive procedure is to be done, such as a biopsy or kidney stone removal, general anesthesia may be needed.  · The cystoscope is inserted. A sterile fluid is put  into the bladder to expand it. You may feel pressure from this fluid.  · When the procedure is done, the cystoscope is removed.  After the procedure  If you had a sedative, general anesthesia, or spinal anesthesia, you must have someone drive you home. Once youre home:  · Drink plenty of fluids.  · You may have burning or light bleeding when you urinate--this is normal.  · Medicines may be prescribed to ease any discomfort or prevent infection. Take these as directed.  · Call your doctor if you have heavy bleeding or blood clots, burning that lasts more than a day, a fever over 100°F  (38° C), or trouble urinating.  Date Last Reviewed: 1/1/2017  © 3082-5877 [employer]. [address] [place] [address], [place] [address]. All rights reserved. This information is not intended as a substitute for professional medical care. Always follow your healthcare professional's instructions.

## 2020-03-16 NOTE — PROCEDURES
"Cystoscopy  Date/Time: 3/16/2020 10:20 AM  Performed by: Christ Nava MD  Authorized by: Christ Nava MD     Consent Done?:  Yes (Written)  Time out: Immediately prior to procedure a "time out" was called to verify the correct patient, procedure, equipment, support staff and site/side marked as required.    Indications comment:  Indwelling ureteral stent  Position:  Dorsal lithotomy  Anesthesia:  Intraurethral instillation  Preparation: Patient was prepped and draped in usual sterile fashion      Scope type:  Rigid cystoscope  Stent removed: Yes    Stent encrusted: No    External exam normal: Yes    Urethra normal: Yes  Bladder neck normal: Bladder neck normal   Bladder normal: Yes      Patient tolerance:  Patient tolerated the procedure well with no immediate complications     Stent successfully removed without any complications.  Patient given instruction for ample p.o. hydration.  Patient will be seen in clinic in 2 months for stone analysis discussion.      "

## 2020-03-17 ENCOUNTER — TELEPHONE (OUTPATIENT)
Dept: UROLOGY | Facility: CLINIC | Age: 85
End: 2020-03-17

## 2020-03-17 NOTE — TELEPHONE ENCOUNTER
----- Message from Johana Calvo sent at 3/13/2020 11:14 AM CDT -----  Contact: Patient   .Type:  Patient Returning Call    Who Called: Patient   Who Left Message for Patient: nurse   Does the patient know what this is regarding?:  Would the patient rather a call back or a response via [facility]? call  Best Call Back Number: [phone]  Additional Information: n/a

## 2020-05-15 ENCOUNTER — OFFICE VISIT (OUTPATIENT)
Dept: UROLOGY | Facility: CLINIC | Age: 85
End: 2020-05-15
Payer: MEDICARE

## 2020-05-15 VITALS
DIASTOLIC BLOOD PRESSURE: 84 MMHG | RESPIRATION RATE: 20 BRPM | TEMPERATURE: 97 F | HEART RATE: 114 BPM | SYSTOLIC BLOOD PRESSURE: 153 MMHG

## 2020-05-15 DIAGNOSIS — N95.2 ATROPHIC VAGINITIS: ICD-10-CM

## 2020-05-15 DIAGNOSIS — R31.0 GROSS HEMATURIA: ICD-10-CM

## 2020-05-15 DIAGNOSIS — N20.0 NEPHROLITHIASIS: ICD-10-CM

## 2020-05-15 DIAGNOSIS — N39.41 URGE INCONTINENCE: ICD-10-CM

## 2020-05-15 DIAGNOSIS — N39.0 RECURRENT UTI (URINARY TRACT INFECTION): Primary | ICD-10-CM

## 2020-05-15 LAB
BILIRUB UR QL STRIP: NEGATIVE
BILIRUB UR QL STRIP: NEGATIVE
GLUCOSE UR QL STRIP: NEGATIVE
GLUCOSE UR QL STRIP: NEGATIVE
KETONES UR QL STRIP: POSITIVE
KETONES UR QL STRIP: POSITIVE
LEUKOCYTE ESTERASE UR QL STRIP: POSITIVE
LEUKOCYTE ESTERASE UR QL STRIP: POSITIVE
PH, POC UA: 5.5
PH, POC UA: 5.5
POC AMORP, URINE: ABNORMAL
POC AMORP, URINE: ABNORMAL
POC BACTI, URINE: ABNORMAL
POC BACTI, URINE: ABNORMAL
POC BLOOD, URINE: POSITIVE
POC BLOOD, URINE: POSITIVE
POC CASTS, URINE: ABNORMAL
POC CASTS, URINE: ABNORMAL
POC CRYST, URINE: ABNORMAL
POC CRYST, URINE: ABNORMAL
POC EPITH, URINE: +1=
POC EPITH, URINE: +1=
POC HCG, URINE: ABNORMAL
POC HCG, URINE: ABNORMAL
POC HYALIN, URINE: 0 LPF
POC HYALIN, URINE: 0 LPF
POC MUCUS, URINE: ABNORMAL
POC MUCUS, URINE: ABNORMAL
POC NITRATES, URINE: POSITIVE
POC NITRATES, URINE: POSITIVE
POC OTHER, URINE: ABNORMAL
POC OTHER, URINE: ABNORMAL
POC RBC, URINE: ABNORMAL HPF
POC RBC, URINE: ABNORMAL HPF
POC WBC, URINE: ABNORMAL HPF
POC WBC, URINE: ABNORMAL HPF
PROT UR QL STRIP: POSITIVE
PROT UR QL STRIP: POSITIVE
SP GR UR STRIP: 1.01 (ref 1–1.03)
SP GR UR STRIP: 1.01 (ref 1–1.03)
UROBILINOGEN UR STRIP-ACNC: 0.2 (ref 0.1–1.1)
UROBILINOGEN UR STRIP-ACNC: 0.2 (ref 0.1–1.1)

## 2020-05-15 PROCEDURE — 81001 URINALYSIS AUTO W/SCOPE: CPT | Mod: S$GLB,,, | Performed by: NURSE PRACTITIONER

## 2020-05-15 PROCEDURE — 1159F MED LIST DOCD IN RCRD: CPT | Mod: S$GLB,,, | Performed by: SPECIALIST

## 2020-05-15 PROCEDURE — 51701 PR INSERTION OF NON-INDWELLING BLADDER CATHETERIZATION FOR RESIDUAL UR: ICD-10-PCS | Mod: S$GLB,,, | Performed by: NURSE PRACTITIONER

## 2020-05-15 PROCEDURE — 1125F PR PAIN SEVERITY QUANTIFIED, PAIN PRESENT: ICD-10-PCS | Mod: S$GLB,,, | Performed by: SPECIALIST

## 2020-05-15 PROCEDURE — 1125F AMNT PAIN NOTED PAIN PRSNT: CPT | Mod: S$GLB,,, | Performed by: SPECIALIST

## 2020-05-15 PROCEDURE — 99213 PR OFFICE/OUTPT VISIT, EST, LEVL III, 20-29 MIN: ICD-10-PCS | Mod: 25,S$GLB,, | Performed by: SPECIALIST

## 2020-05-15 PROCEDURE — 1159F PR MEDICATION LIST DOCUMENTED IN MEDICAL RECORD: ICD-10-PCS | Mod: S$GLB,,, | Performed by: SPECIALIST

## 2020-05-15 PROCEDURE — 81001 PR  URINALYSIS, AUTO, W/SCOPE: ICD-10-PCS | Mod: S$GLB,,, | Performed by: NURSE PRACTITIONER

## 2020-05-15 PROCEDURE — 99213 OFFICE O/P EST LOW 20 MIN: CPT | Mod: 25,S$GLB,, | Performed by: SPECIALIST

## 2020-05-15 PROCEDURE — 51701 INSERT BLADDER CATHETER: CPT | Mod: S$GLB,,, | Performed by: NURSE PRACTITIONER

## 2020-05-15 PROCEDURE — 1101F PR PT FALLS ASSESS DOC 0-1 FALLS W/OUT INJ PAST YR: ICD-10-PCS | Mod: CPTII,S$GLB,, | Performed by: SPECIALIST

## 2020-05-15 PROCEDURE — 1101F PT FALLS ASSESS-DOCD LE1/YR: CPT | Mod: CPTII,S$GLB,, | Performed by: SPECIALIST

## 2020-05-15 RX ORDER — TRAVOPROST OPHTHALMIC SOLUTION 0.04 MG/ML
SOLUTION OPHTHALMIC
COMMUNITY
Start: 2020-03-02

## 2020-05-15 NOTE — PROGRESS NOTES
Chief Complaint:   Chief Complaint   Patient presents with    f/u cysto       HPI: 84 year old  female known to the service of Dr. Nava who presents for follow up.      She was complaining of vaginal pain with inability to hold urine with urgency incontinence, daytime frequency more than 8 times, and nocturia.  The urgency incontinence was worse at night.  She was also experiencing recurrent UTIs.  She went underwent evaluation with cystoscopy on 12/02/2019 revealing external genitalia findings associated with atrophic vaginitis, which was likely the cause of her pain and multiple diagnoses of UTI. She was prescribed estrogen vaginal therapy and Urimar-T after procedure, denies taking these medications anymore.  Today she does report burning with urination.  She feels like she empties her bladder completely.     During the cystoscopy, there was sarah reddish urine from the right ureteral orifice so CT urogram was ordered, revealing a large (around 9 mm) see stone in the right kidney with hydronephrosis and narrowing of the renal pelvis suggestive of a chronic UPJ obstruction.  She underwent cystourethroscopy with ureteroscopy and pyeloscopy with lithotripsy and placement of a right ureteral stent on 3/10/2020 with subsequent removal of stent here in clinic on 3/16/2020, she presents today for stone analysis results.  Stone analysis revealed primary composed your of 90% calcium oxalate monohydrate and 10% calcium oxalate dihydrate.  Patient denies any further episodes of hematuria, no flank pain today.    Her main concern today is that the urinary frequency and urge incontinence is not improving.  She states that she goes to the restroom every hour during the day and wakes up at night 4-6 times to urinate.  She recently experienced a fall and seizure. She is now experiencing weakness and a slower gait, having to use a walker/cane to ambulate sometimes, so she is unable to make it to the restroom in time.  She feels like her symptoms of frequency and urgency are getting worse.  Lifestyle choices include drinking coffee rarely, 1 soda daily, and an increased amount of water.  She reports seeing another urologist in the past who was giving her medication that helped, she is requesting to get back on this medication but is unsure of the name.    She admits to low back pain as well as generalized weakness related to the recent fall which also resulted in multiple bruising to her bilateral arms and face. Of note, she does take Eliquis for atrial fibrillation.     Allergies:  Patient has no known allergies.    Medications: has a current medication list which includes the following prescription(s): acarbose, cholecalciferol (vitamin d3), diltiazem, eliquis, estradiol, levetiracetam, metoprolol tartrate, mirabegron, sertraline, and travoprost.    Review of Systems:  General: No fever, chills, vision changes, dizziness, weakness, fatigue, unexplained weight loss, confusion, or mood swings.  Skin: No rashes, itching, or changes in color/texture of skin.  Chest: Denies LLOYD, cyanosis, wheezing, cough, and sputum production.  Abdomen: Appetite fine. Denies diarrhea, abdominal pain, hematemesis, or blood in stool.  Musculoskeletal: No joint stiffness or swelling. No painful lymph nodes  : As above.  All other review of systems negative.    PMH:   has a past medical history of A-fib, Hypertension, Hypothyroidism, and Seizures.    PSH:   has a past surgical history that includes Hernia repair; Tonsillectomy; and Bladder surgery.    FamHx: family history includes Breast cancer in her other; Cancer in her other; Seizures in her mother.    SocHx:  reports that she has never smoked. She has never used smokeless tobacco. She reports that she drank alcohol. She reports that she does not use drugs.      Physical Exam:  Vitals:    05/15/20 1041   BP: (!) 153/84   Pulse: (!) 114   Resp: 20   Temp: 97 °F (36.1 °C)     General: AAOx3, no  apparent distress, multiple bruises noted on bilateral arms and face due to recent fall  Neck: supple, no masses, normal thyroid, full ROM  Lungs: CTAB, no adventitious breath sounds, normal inspiration, no use of accessory muscles  Heart: regular rate and rhythm, no arrhythmias  Abdomen: soft, NT, ND, no masses, no hernias, no hepatosplenomegaly  Lymphatic: no unusually enlarged or tender lymph nodes  Skin: warm and dry, no jaundice, no rash  Ext: without edema or deformity, GUAMAN, slow gait  : deferred    Labs/Studies: UA voided sample reveals WBCs TNTC, RBCs 50-60/hpf, epi +/-, bact 4+, mucus 1+, nitrite +; PVR 25ml; UA cath sample reveals WBCs 100+, RBCs 30-40/hpf, epi +/-, bact 4+, mucus 1+, nitrite +    Impression/Plan:   Recurrent UTI (urinary tract infection)  Comments:  c/o burning with urination and dark urine- UA findings c/w UTI- will send for culture and treat accordingly, has assoc atrophic vaginitis  Orders:  -     POCT Urinalysis (w/Micro Option)  -     POCT Urinalysis (w/Micro Option)  -     Straight Cath  -     Urine culture    Urge incontinence  Comments:  exacerbated by a functional component; previously prescribed a med by another provider but pt unsure of name; will start a trial of Myrbetriq 25mg daily   Orders:  -     mirabegron (MYRBETRIQ) 25 mg Tb24 ER tablet; Take 1 tablet (25 mg total) by mouth once daily.    Nephrolithiasis  Comments:  s/p intervention in 3/2020, stone analysis reveals primary composure of calcium oxalate monohydrate (90%), behavioral modifications discussed    Atrophic vaginitis  Comments:  previously maintained on estradiol cream but patient states that she has not been using it lately    Gross hematuria  Comments:  resolved, no further episodes, recent cystoscopy    HTN: discussed and referred to PCP for further management.    Follow up in about 6 weeks (around 6/26/2020) for re-eval of symptoms after starting Myrbetriq.

## 2020-05-15 NOTE — PROGRESS NOTES
Patient seen and examined to get with the nurse practitioner.  I agree with the nurse practitioner's findings assessment and plan.    Briefly 84-year-old female presented with for evaluation for recurring UTIs and hematuria.  It turns out that she had atrophic vaginitis which we managed with topical estrogen vaginal cream.  In the course of doing the evaluation for urinary tract infection she had a ureteral calculus which we managed and treated.    Today, her major complaint is incontinence.  She has a combination of both urgency and functional incontinence.  She had been on a medication prior for her incontinence given to her by another urologist that did work for her.  But she cannot remember the name.    The plan today will be to start her on Myrbetriq 25 mg samples.  Continue to observe the atrophic vaginitis she she is not having any of those complaints today.  We also talked about timed voiding every 2-3 hours since that is a reasonable component of functionality contributing to her incontinence.  Bring her back to clinic in several weeks to reassess how she is responding to therapy.

## 2020-05-17 LAB — URINE CULTURE, ROUTINE: NORMAL

## 2020-05-18 ENCOUNTER — TELEPHONE (OUTPATIENT)
Dept: UROLOGY | Facility: CLINIC | Age: 85
End: 2020-05-18

## 2020-05-18 DIAGNOSIS — N39.0 RECURRENT UTI (URINARY TRACT INFECTION): Primary | ICD-10-CM

## 2020-05-18 RX ORDER — SULFAMETHOXAZOLE AND TRIMETHOPRIM 800; 160 MG/1; MG/1
1 TABLET ORAL 2 TIMES DAILY
Qty: 14 TABLET | Refills: 0 | Status: SHIPPED | OUTPATIENT
Start: 2020-05-18 | End: 2020-05-25

## 2020-05-18 NOTE — TELEPHONE ENCOUNTER
Please call patient and let her know that urine culture was positive for infection so antibiotics sent to the pharmacy. She needs to complete the whole course of antibiotics even if she starts to feel better and needs to follow up in office for a drop off UA 2 weeks after completion of her medication.

## 2020-05-18 NOTE — TELEPHONE ENCOUNTER
Contacted pt regarding rx. No answer, left vm. BJP      ----- Message from Christie Hill sent at 5/18/2020 11:05 AM CDT -----  Contact: pt  Caller is calling in regrads to a medication that they talked about in the office on Friday the name of the med is cephalexin 250 mg. Please call back at [phone] (home) thanks

## 2020-05-21 ENCOUNTER — TELEPHONE (OUTPATIENT)
Dept: UROLOGY | Facility: CLINIC | Age: 85
End: 2020-05-21

## 2020-05-21 NOTE — TELEPHONE ENCOUNTER
Call returned.  Pt was contacted and informed to  medication at preferred pharmacy, complete medication, and follow up in 2 weeks after completion for urine drop off. Pt verbalized understanding.    ----- Message from Christie Hill sent at 5/21/2020 12:18 PM CDT -----  Contact: pt  Type:  Patient Returning Call    Who Called:pt  Who Left Message for Patient:no  Does the patient know what this is regarding?:no  Would the patient rather a call back or a response via [facility]? Call back  Best Call Back Number:[phone]  Additional Information: none

## 2020-06-04 ENCOUNTER — TELEPHONE (OUTPATIENT)
Dept: UROLOGY | Facility: CLINIC | Age: 85
End: 2020-06-04

## 2020-06-04 ENCOUNTER — CLINICAL SUPPORT (OUTPATIENT)
Dept: UROLOGY | Facility: CLINIC | Age: 85
End: 2020-06-04
Payer: MEDICARE

## 2020-06-04 DIAGNOSIS — N39.0 RECURRENT UTI (URINARY TRACT INFECTION): Primary | ICD-10-CM

## 2020-06-04 LAB
BILIRUB UR QL STRIP: NEGATIVE
CLARITY, POC UA: ABNORMAL
COLOR, POC UA: ABNORMAL
GLUCOSE UR QL STRIP: NEGATIVE
KETONES UR QL STRIP: NEGATIVE
LEUKOCYTE ESTERASE UR QL STRIP: POSITIVE
NITRITE, POC UA: ABNORMAL
PH, POC UA: 6
POC AMORP, URINE: ABNORMAL
POC BACTI, URINE: ABNORMAL
POC BLOOD, URINE: POSITIVE
POC CASTS, URINE: ABNORMAL
POC CRYST, URINE: ABNORMAL
POC EPITH, URINE: ABNORMAL
POC HCG, URINE: ABNORMAL
POC HYALIN, URINE: 0 LPF
POC MUCUS, URINE: ABNORMAL
POC NITRATES, URINE: NEGATIVE
POC OTHER, URINE: ABNORMAL
POC RBC, URINE: ABNORMAL HPF
POC WBC, URINE: ABNORMAL HPF
PROT UR QL STRIP: POSITIVE
SP GR UR STRIP: 1.02 (ref 1–1.03)
UROBILINOGEN UR STRIP-ACNC: 0.2 (ref 0.1–1.1)

## 2020-06-04 NOTE — TELEPHONE ENCOUNTER
UA drop off results reviewed, patient recently treated for UTI. Please call her and see if she is still symptomatic as she may need to come in to provide a cath sample      UA voided sample shows WBCs TNTC/hpf, RBCs 50-60/hpf, epi 1+, bact 2+, and mucus 1+

## 2020-06-04 NOTE — TELEPHONE ENCOUNTER
Attempted to contact pt no answer at either number. NB      ----- Message from Gemini Jasso sent at 6/4/2020 12:42 PM CDT -----  Contact: PT's daughter ( Lei)   Type:  Patient Returning Call    Who Called:Cherie Hunt's daughter Inna  Who Left Message for Patient:  N/A  Does the patient know what this is regarding?: Urine Sample   Would the patient rather a call back or a response via [facility]? Call back   Best Call Back Number:[phone] (Home)   [phone] (Inna's cell phone)    Additional Information: Patient's daughter is returning doctor's office phone call

## 2020-06-04 NOTE — TELEPHONE ENCOUNTER
Pt contacted to f/u on UTI symptoms and pt is c/o dysuria. Pt states that she will come to the office Fri 06/05/2020 at 1100 for a cath urine sample. Pt verbalized understanding.

## 2020-06-05 ENCOUNTER — CLINICAL SUPPORT (OUTPATIENT)
Dept: UROLOGY | Facility: CLINIC | Age: 85
End: 2020-06-05
Payer: MEDICARE

## 2020-06-05 DIAGNOSIS — N39.0 RECURRENT UTI (URINARY TRACT INFECTION): Primary | ICD-10-CM

## 2020-06-05 LAB
BILIRUB UR QL STRIP: NEGATIVE
CLARITY, POC UA: ABNORMAL
COLOR, POC UA: ABNORMAL
GLUCOSE UR QL STRIP: NEGATIVE
KETONES UR QL STRIP: NEGATIVE
LEUKOCYTE ESTERASE UR QL STRIP: POSITIVE
Lab: 70 ML
NITRITE, POC UA: ABNORMAL
PH, POC UA: 6
POC AMORP, URINE: ABNORMAL
POC BACTI, URINE: ABNORMAL
POC BLOOD, URINE: POSITIVE
POC CASTS, URINE: ABNORMAL
POC CRYST, URINE: ABNORMAL
POC EPITH, URINE: ABNORMAL
POC HCG, URINE: ABNORMAL
POC HYALIN, URINE: 0 LPF
POC MUCUS, URINE: ABNORMAL
POC NITRATES, URINE: NEGATIVE
POC OTHER, URINE: ABNORMAL
POC RBC, URINE: ABNORMAL HPF
POC WBC, URINE: ABNORMAL HPF
PROT UR QL STRIP: POSITIVE
SP GR UR STRIP: 1.02 (ref 1–1.03)
UROBILINOGEN UR STRIP-ACNC: 0.2 (ref 0.1–1.1)

## 2020-06-05 PROCEDURE — 51701 PR INSERTION OF NON-INDWELLING BLADDER CATHETERIZATION FOR RESIDUAL UR: ICD-10-PCS | Mod: S$GLB,,, | Performed by: NURSE PRACTITIONER

## 2020-06-05 PROCEDURE — 51701 INSERT BLADDER CATHETER: CPT | Mod: S$GLB,,, | Performed by: NURSE PRACTITIONER

## 2020-06-05 PROCEDURE — 81001 URINALYSIS AUTO W/SCOPE: CPT | Mod: S$GLB,,, | Performed by: NURSE PRACTITIONER

## 2020-06-05 PROCEDURE — 81001 PR  URINALYSIS, AUTO, W/SCOPE: ICD-10-PCS | Mod: S$GLB,,, | Performed by: NURSE PRACTITIONER

## 2020-06-08 ENCOUNTER — TELEPHONE (OUTPATIENT)
Dept: UROLOGY | Facility: CLINIC | Age: 85
End: 2020-06-08

## 2020-06-08 LAB — URINE CULTURE, ROUTINE: NORMAL

## 2020-06-08 NOTE — TELEPHONE ENCOUNTER
Contacted pt and N/A. Msg was left on VM to return call.     ----- Message from Laurie Quinn NP sent at 6/8/2020  8:40 AM CDT -----  Please call patient and let her know that urine culture was negative for UTI and if she is still having any symptoms that she needs to discuss then she can make an OV

## 2020-06-08 NOTE — TELEPHONE ENCOUNTER
Call returned. Pt was informed of urine culture results and verbalized understanding, but is c/o of a burning sensation after voiding. Pt is scheduled to see NP on Wed 06/10/2020 at 1100.    ----- Message from Meghna Childers sent at 6/8/2020 10:49 AM CDT -----  Contact: Pt  Type:  Patient Returning Call    Who Called:Pt  Who Left Message for Patient:Nurse  Does the patient know what this is regarding?:  Would the patient rather a call back or a response via [facility]? Call back  Best Call Back Number:[phone]  Additional Information:

## 2020-06-10 ENCOUNTER — OFFICE VISIT (OUTPATIENT)
Dept: UROLOGY | Facility: CLINIC | Age: 85
End: 2020-06-10
Payer: MEDICARE

## 2020-06-10 ENCOUNTER — TELEPHONE (OUTPATIENT)
Dept: UROLOGY | Facility: CLINIC | Age: 85
End: 2020-06-10

## 2020-06-10 VITALS
RESPIRATION RATE: 18 BRPM | BODY MASS INDEX: 32.96 KG/M2 | HEIGHT: 63 IN | WEIGHT: 186 LBS | SYSTOLIC BLOOD PRESSURE: 139 MMHG | DIASTOLIC BLOOD PRESSURE: 95 MMHG

## 2020-06-10 DIAGNOSIS — N39.41 URGE INCONTINENCE: ICD-10-CM

## 2020-06-10 DIAGNOSIS — N39.0 RECURRENT UTI (URINARY TRACT INFECTION): Primary | ICD-10-CM

## 2020-06-10 DIAGNOSIS — N95.2 ATROPHIC VAGINITIS: ICD-10-CM

## 2020-06-10 LAB
BILIRUB UR QL STRIP: NEGATIVE
BILIRUB UR QL STRIP: NEGATIVE
CLARITY, POC UA: ABNORMAL
CLARITY, POC UA: ABNORMAL
COLOR, POC UA: ABNORMAL
COLOR, POC UA: ABNORMAL
GLUCOSE UR QL STRIP: NEGATIVE
GLUCOSE UR QL STRIP: NEGATIVE
KETONES UR QL STRIP: NEGATIVE
KETONES UR QL STRIP: NEGATIVE
LEUKOCYTE ESTERASE UR QL STRIP: POSITIVE
LEUKOCYTE ESTERASE UR QL STRIP: POSITIVE
NITRITE, POC UA: ABNORMAL
NITRITE, POC UA: ABNORMAL
PH, POC UA: 7.5
PH, POC UA: 8
POC AMORP, URINE: ABNORMAL
POC AMORP, URINE: ABNORMAL
POC BACTI, URINE: ABNORMAL
POC BACTI, URINE: ABNORMAL
POC BLOOD, URINE: POSITIVE
POC BLOOD, URINE: POSITIVE
POC CASTS, URINE: ABNORMAL
POC CASTS, URINE: ABNORMAL
POC CRYST, URINE: ABNORMAL
POC CRYST, URINE: ABNORMAL
POC EPITH, URINE: ABNORMAL
POC EPITH, URINE: ABNORMAL
POC HCG, URINE: ABNORMAL
POC HCG, URINE: ABNORMAL
POC HYALIN, URINE: 0 LPF
POC HYALIN, URINE: 0 LPF
POC MUCUS, URINE: ABNORMAL
POC MUCUS, URINE: ABNORMAL
POC NITRATES, URINE: NEGATIVE
POC NITRATES, URINE: NEGATIVE
POC OTHER, URINE: ABNORMAL
POC OTHER, URINE: ABNORMAL
POC RBC, URINE: ABNORMAL HPF
POC RBC, URINE: ABNORMAL HPF
POC WBC, URINE: ABNORMAL HPF
POC WBC, URINE: ABNORMAL HPF
PROT UR QL STRIP: POSITIVE
PROT UR QL STRIP: POSITIVE
SP GR UR STRIP: 1.01 (ref 1–1.03)
SP GR UR STRIP: 1.01 (ref 1–1.03)
UROBILINOGEN UR STRIP-ACNC: 1 (ref 0.1–1.1)
UROBILINOGEN UR STRIP-ACNC: 1 (ref 0.1–1.1)

## 2020-06-10 PROCEDURE — 1159F MED LIST DOCD IN RCRD: CPT | Mod: S$GLB,,, | Performed by: NURSE PRACTITIONER

## 2020-06-10 PROCEDURE — 81001 PR  URINALYSIS, AUTO, W/SCOPE: ICD-10-PCS | Mod: S$GLB,,, | Performed by: NURSE PRACTITIONER

## 2020-06-10 PROCEDURE — 1101F PT FALLS ASSESS-DOCD LE1/YR: CPT | Mod: CPTII,S$GLB,, | Performed by: NURSE PRACTITIONER

## 2020-06-10 PROCEDURE — 1101F PR PT FALLS ASSESS DOC 0-1 FALLS W/OUT INJ PAST YR: ICD-10-PCS | Mod: CPTII,S$GLB,, | Performed by: NURSE PRACTITIONER

## 2020-06-10 PROCEDURE — 51701 INSERT BLADDER CATHETER: CPT | Mod: S$GLB,,, | Performed by: NURSE PRACTITIONER

## 2020-06-10 PROCEDURE — 51701 PR INSERTION OF NON-INDWELLING BLADDER CATHETERIZATION FOR RESIDUAL UR: ICD-10-PCS | Mod: S$GLB,,, | Performed by: NURSE PRACTITIONER

## 2020-06-10 PROCEDURE — 81001 URINALYSIS AUTO W/SCOPE: CPT | Mod: S$GLB,,, | Performed by: NURSE PRACTITIONER

## 2020-06-10 PROCEDURE — 99214 OFFICE O/P EST MOD 30 MIN: CPT | Mod: 25,S$GLB,, | Performed by: NURSE PRACTITIONER

## 2020-06-10 PROCEDURE — 99214 PR OFFICE/OUTPT VISIT, EST, LEVL IV, 30-39 MIN: ICD-10-PCS | Mod: 25,S$GLB,, | Performed by: NURSE PRACTITIONER

## 2020-06-10 PROCEDURE — 1159F PR MEDICATION LIST DOCUMENTED IN MEDICAL RECORD: ICD-10-PCS | Mod: S$GLB,,, | Performed by: NURSE PRACTITIONER

## 2020-06-10 RX ORDER — SOLIFENACIN SUCCINATE 5 MG/1
5 TABLET, FILM COATED ORAL DAILY
Qty: 30 TABLET | Refills: 1 | Status: SHIPPED | OUTPATIENT
Start: 2020-06-10 | End: 2021-06-10

## 2020-06-10 NOTE — TELEPHONE ENCOUNTER
----- Message from Alma Alejandra sent at 6/9/2020 10:19 AM CDT -----  Contact: Pateint   patient requesting a call back to discuss urine and bladder problem .Please call back at [phone].        Thanks,  Alma Alejandra

## 2020-06-10 NOTE — PROGRESS NOTES
Chief Complaint:   Chief Complaint   Patient presents with    Flank Pain     off and on for a while    Dysuria     off and on for a while       HPI:  84 year old  female known to the service of Dr. Nava who presents with complaints of dysuria.     She also initialy reported flank pain, however, patient has a history of seizures and had a recent fall with resultant back problems and attributes the flank pain as a result.  She has had kidney stones in the past, and the pain is not the same per her report.    She has a history of recurrent UTIs, provided a cath sample in the clinic recently that was negative for infection.  Urinalysis findings today consistent with infection, so we will send for culture and treat accordingly.  She was last treated for a UTI on 5/18/2020 with Bactrim DS for positive urine culture showing E. Coli.  She underwent cystoscopy on 12/02/2019 for evaluation of cause of recurrent UTIs, external genitalia findings were associated with atrophic vaginitis.  She was prescribed estradiol gel which she took as prescribed times 60 days, but is no longer on this medication.  She is not sure if her symptoms were better controlled when she was on this medication, and is not opposed to restarting it if necessary.     She admits to burning with urination, described as a throbbing sensation that starts during the middle of urination and gradually worsens throughout her urination and at the end her pain is the worst.  She has associated urinary frequency and urgency.     She also admits to urge incontinence.  There is also a functional component to this issue as she has frequent falls and resultant ortho issues (back pain and knee pain requiring athroscopy soon).  She states that she wears her predominant as during the day and has to change them 7 times as she is unable to make it to the restroom in time.  Her last visit she was given samples of Myrbetriq, states that she stopped taking it as it  did not help at all.  She is not a candidate for anticholinergics due to her advanced age and frequent falls.    She has a history of nephrolithiasis, last intervention in May 2020.  She denies any hematuria or flank pain related to stones today.    Allergies:  Patient has no known allergies.    Medications: has a current medication list which includes the following prescription(s): acarbose, cholecalciferol (vitamin d3), diltiazem, eliquis, levetiracetam, metoprolol tartrate, sertraline, travoprost, estradiol, and solifenacin.    Review of Systems:  General: No fever, chills, vision changes, dizziness, weakness, fatigue, unexplained weight loss, confusion, or mood swings.  Skin: No rashes, itching, or changes in color/texture of skin.  Chest: Denies LLOYD, cyanosis, wheezing, cough, and sputum production.  Abdomen: Appetite fine. Denies diarrhea, abdominal pain, hematemesis, or blood in stool.  Musculoskeletal: No joint stiffness or swelling. No painful lymph nodes  : As above.  All other review of systems negative.    PMH:   has a past medical history of A-fib, Hematuria, Hypertension, Hypothyroidism, Nephrolithiasis, and Seizures.    PSH:   has a past surgical history that includes Hernia repair; Tonsillectomy; and Bladder surgery.    FamHx: family history includes Breast cancer in her other; Cancer in her other; Seizures in her mother.    SocHx:  reports that she has never smoked. She has never used smokeless tobacco. She reports that she drank alcohol. She reports that she does not use drugs.      Physical Exam:  Vitals:    06/10/20 1057   BP: (!) 139/95   Resp: 18     General: AAOx3, no apparent distress, no deformities  Neck: supple, no masses, normal thyroid, full ROM  Lungs: CTAB, no adventitious breath sounds, normal inspiration, no use of accessory muscles  Heart: regular rate and rhythm, no arrhythmias  Abdomen: soft, NT, ND, no masses, no hernias, no hepatosplenomegaly  Lymphatic: no unusually enlarged  or tender lymph nodes  Skin: warm and dry, no jaundice, no rash  Ext: without edema or deformity, GUAMAN, ambulates independently  : deferred    Labs/Studies: UA voided sample shows WBCs TNTC/hpf, RBCs 10-20/hpf, epi +/-, bact 3+; PVR 30mL; UA cath sample shows WBCs TNTC/hpf, RBCs 10-20/hpf, epi -, bact 2+    Impression/Plan:   Recurrent UTI (urinary tract infection)  Comments:  UA findings c/w UTI, will send for culture and treat accordingly, if culture negative then will restart estradiol gel to see if symptoms improve  Orders:  -     POCT Urinalysis (w/Micro Option)  -     Straight Cath  -     POCT Urinalysis (w/Micro Option)  -     Urine culture    Urge incontinence  Comments:  Myrbetriq not working, also a functional component, will start Vesicare 5mg daily to see if symptoms improve  Orders:  -     solifenacin (VESICARE) 5 MG tablet; Take 1 tablet (5 mg total) by mouth once daily.  Dispense: 30 tablet; Refill: 1    Atrophic vaginitis  Comments:  as noted on last cysto, was previously on estradiol but no longer taking, will consider restart if urine culture is negative        Follow up in about 6 weeks (around 7/22/2020) for re-eval of symptoms.

## 2020-06-11 ENCOUNTER — TELEPHONE (OUTPATIENT)
Dept: UROLOGY | Facility: CLINIC | Age: 85
End: 2020-06-11

## 2020-06-11 NOTE — TELEPHONE ENCOUNTER
Patient stated that Myrbetriq did not work for her, Vesicare is too expensive, and she is not a candidate for anticholinergics due to her advanced age and frequent falls, please call her to get with her insurance to see which medications they cover?

## 2020-06-11 NOTE — TELEPHONE ENCOUNTER
Pt contacted office to check on status of rx for antibiotic. Pt was informed that once UA culture results come back, a decision will be made on what treatment is needed. Pt verbalized understanding.  Pt states that Vesicare that was prescribed to pharmacy was not ready for . I informed pt that I will contact preferred pharmacy, [facility],  to check on status.    Call was made to pharmacy to check on status. Pharmacy staff stated rx for Vesicare was received, but not covered by insurance and will be almost $300. Staff also stated that a PA is not required, but she will send a list of alternate medications via fax.

## 2020-06-12 ENCOUNTER — TELEPHONE (OUTPATIENT)
Dept: UROLOGY | Facility: CLINIC | Age: 85
End: 2020-06-12

## 2020-06-12 LAB — URINE CULTURE, ROUTINE: NORMAL

## 2020-06-12 NOTE — TELEPHONE ENCOUNTER
Call returned to number provided in previous msg and cell phone. N/A and msg was left on VM to return call. Unable to LM on contact # [phone].     ----- Message from Christie Hill sent at 6/12/2020  2:29 PM CDT -----  Contact: pt  Type:  Patient Returning Call    Who Called:pt  Who Left Message for Patient:no  Does the patient know what this is regarding?:maybe results  Would the patient rather a call back or a response via [facility]? Call back  Best Call Back Number:[phone]  Additional Information: none

## 2020-06-12 NOTE — TELEPHONE ENCOUNTER
Contacted pt. N/A and msg left on VM to return call.     ----- Message from Laurie Quinn NP sent at 6/12/2020  8:30 AM CDT -----  Please call patient and let her know that urine culture was negative

## 2020-06-12 NOTE — TELEPHONE ENCOUNTER
PA request was sent from [facility] pharmacy and completed via Cover my meds on 06/12/2020. Decision is pending.

## 2020-06-23 ENCOUNTER — TELEPHONE (OUTPATIENT)
Dept: UROLOGY | Facility: CLINIC | Age: 85
End: 2020-06-23

## 2020-06-23 NOTE — TELEPHONE ENCOUNTER
Pt daughter called to inform clinic that pt has been seen several times for discomfort and has provided several urine specimens which have had negative results. Nurse confirmed that was correct. She states that pt went to Dr. De Souza's office on 6/19/20 and was told that she has a very bad UTI. Nurse informed daughter that the pt's last time in clinic for UA drop off was 6/10/20 and that it could take time for symptoms to manifest and if treatment is provided for no symptoms that the pt can develop antibiotic resistence. Daughter verbalized understanding and states that from now on pt will be seen only by PCP. Nurse verbalized understanding and states that she will inform SANG SIU

## 2023-04-11 DIAGNOSIS — R30.0 DYSURIA: Primary | ICD-10-CM

## 2023-04-11 DIAGNOSIS — N39.0 UTI (URINARY TRACT INFECTION): ICD-10-CM

## 2023-05-18 ENCOUNTER — OFFICE VISIT (OUTPATIENT)
Dept: UROLOGY | Facility: CLINIC | Age: 88
End: 2023-05-18
Payer: MEDICARE

## 2023-05-18 VITALS — HEIGHT: 63 IN | RESPIRATION RATE: 20 BRPM | WEIGHT: 186 LBS | BODY MASS INDEX: 32.96 KG/M2

## 2023-05-18 DIAGNOSIS — R30.0 DYSURIA: Primary | ICD-10-CM

## 2023-05-18 DIAGNOSIS — N39.0 RECURRENT UTI: ICD-10-CM

## 2023-05-18 PROCEDURE — 99213 PR OFFICE/OUTPT VISIT, EST, LEVL III, 20-29 MIN: ICD-10-PCS | Mod: S$GLB,,, | Performed by: NURSE PRACTITIONER

## 2023-05-18 PROCEDURE — 99213 OFFICE O/P EST LOW 20 MIN: CPT | Mod: S$GLB,,, | Performed by: NURSE PRACTITIONER

## 2023-05-18 RX ORDER — TAMSULOSIN HYDROCHLORIDE 0.4 MG/1
CAPSULE ORAL
COMMUNITY
Start: 2023-04-21

## 2023-05-18 RX ORDER — LORAZEPAM 0.5 MG/1
TABLET ORAL
COMMUNITY
Start: 2023-04-28

## 2023-05-18 RX ORDER — TRAZODONE HYDROCHLORIDE 50 MG/1
TABLET ORAL
COMMUNITY
Start: 2023-04-13

## 2023-05-18 RX ORDER — CEPHALEXIN 250 MG/1
CAPSULE ORAL
COMMUNITY
Start: 2023-05-11

## 2023-05-18 RX ORDER — LAMOTRIGINE 25 MG/1
TABLET ORAL
COMMUNITY
Start: 2023-04-17

## 2023-05-18 NOTE — PROGRESS NOTES
Subjective:       Patient ID: Cherie Hunt is a 87 y.o. female.    Chief Complaint: UTI (Pt recently was treated for UTI (03-31-23).  Having symptoms again)      HPI: 87-year-old female, established patient, last seen 05/2020.    Patient has history of frequent UTIs.    This time patient is asymptomatic.    She denies any pain or burning urination.  Denies any odor to the urine.  Denies any fever or body aches.  Denies any blood in urine.    Typically when she has an infection she will have burning with urination, confusion, frequency, and urgency.      She would a recent infection.  She had a urine culture on 04/01/2023 shown Proteus.  She was treated with gentamicin for 7 days.  She is on cephalexin 250 mg daily for preventative use.      No other urinary complaints at this time.       Past Medical History:   Past Medical History:   Diagnosis Date    A-fib     Hematuria     Hypertension     Hypothyroidism     Nephrolithiasis     Seizures        Past Surgical Historical:   Past Surgical History:   Procedure Laterality Date    BLADDER SURGERY      bladder tact X2    HERNIA REPAIR      TONSILLECTOMY          Medications:   Medication List with Changes/Refills   Current Medications    ACARBOSE (PRECOSE) 25 MG TAB    Take 25 mg by mouth 3 (three) times daily with meals.    CEPHALEXIN (KEFLEX) 250 MG CAPSULE        CHOLECALCIFEROL, VITAMIN D3, (VITAMIN D3) 25 MCG (1,000 UNIT) CAPSULE    Take 1,000 Units by mouth once daily.    DILTIAZEM (CARDIZEM CD) 240 MG 24 HR CAPSULE        ELIQUIS 2.5 MG TAB        ESTRADIOL (ESTRACE) 0.01 % (0.1 MG/GRAM) VAGINAL CREAM    Place 1 g vaginally once daily for 30 days, THEN 1 g 3 (three) times a week.    LAMOTRIGINE (LAMICTAL) 25 MG TABLET        LEVETIRACETAM (KEPPRA) 500 MG TAB        LORAZEPAM (ATIVAN) 0.5 MG TABLET        METOPROLOL TARTRATE (LOPRESSOR) 50 MG TABLET        SERTRALINE (ZOLOFT) 25 MG TABLET        SOLIFENACIN (VESICARE) 5 MG TABLET    Take 1 tablet (5 mg total)  by mouth once daily.    TAMSULOSIN (FLOMAX) 0.4 MG CAP        TRAVOPROST (TRAVATAN Z) 0.004 % OPHTHALMIC SOLUTION        TRAZODONE (DESYREL) 50 MG TABLET            Past Social History:   Social History     Socioeconomic History    Marital status:    Tobacco Use    Smoking status: Never    Smokeless tobacco: Never   Substance and Sexual Activity    Alcohol use: Not Currently    Drug use: Never       Allergies: Review of patient's allergies indicates:  No Known Allergies     Family History:   Family History   Problem Relation Age of Onset    Seizures Mother     Cancer Other     Breast cancer Other         Review of Systems:  Review of Systems   Constitutional:  Negative for activity change and appetite change.   HENT:  Negative for congestion and dental problem.    Respiratory:  Negative for chest tightness and shortness of breath.    Cardiovascular:  Negative for chest pain.   Gastrointestinal:  Negative for abdominal distention.   Genitourinary:  Negative for decreased urine volume, difficulty urinating, dyspareunia, dysuria, enuresis, flank pain, frequency, genital sores, hematuria, pelvic pain and urgency.   Musculoskeletal:  Negative for back pain and neck pain.   Allergic/Immunologic: Negative for immunocompromised state.   Neurological:  Negative for dizziness.   Hematological:  Negative for adenopathy.   Psychiatric/Behavioral:  Negative for agitation, behavioral problems and confusion.      Physical Exam:  Physical Exam  Vitals and nursing note reviewed.   Constitutional:       Appearance: She is well-developed.   HENT:      Head: Normocephalic.   Cardiovascular:      Rate and Rhythm: Normal rate and regular rhythm.      Heart sounds: Normal heart sounds.   Pulmonary:      Effort: Pulmonary effort is normal.      Breath sounds: Normal breath sounds.   Abdominal:      General: Bowel sounds are normal.      Palpations: Abdomen is soft.   Skin:     General: Skin is warm and dry.   Neurological:       Mental Status: She is alert and oriented to person, place, and time.       Assessment/Plan:   Recurring UTI:  Patient is asymptomatic at this time.  Patient is at [facility].  Will have UA with urine culture collected for re-evaluation.    Patient instructed to notify us for any signs or symptoms of infection.  Patient continue cephalexin as directed.      Will tentatively plan follow-up in 1 year, sooner if needed.  Problem List Items Addressed This Visit    None  Visit Diagnoses       Dysuria    -  Primary    Recurrent UTI

## 2023-06-06 ENCOUNTER — TELEPHONE (OUTPATIENT)
Dept: UROLOGY | Facility: CLINIC | Age: 88
End: 2023-06-06
Payer: MEDICARE

## 2023-06-06 NOTE — TELEPHONE ENCOUNTER
Home number not going through, mobile has no voice mail, left message with contact, Yaa (no involvement of care)    Per  UA C&S, pt needs gent 80 mg q day for 7 days.

## 2023-06-12 ENCOUNTER — TELEPHONE (OUTPATIENT)
Dept: UROLOGY | Facility: CLINIC | Age: 88
End: 2023-06-12
Payer: MEDICARE

## 2023-06-12 NOTE — TELEPHONE ENCOUNTER
No voice mail on mobile, home number not working. Pt at [facility]. Spoke with Teressa pt's nurse. She asked I fax C&S with order. Noted, pt on daily cephalexin 250mg. Will check with provider before faxing orders.

## 2024-03-18 NOTE — HIM RECORD RETIREMENT NOTE
senior care of Incomplete Medical Record    3/18/24    Patient Name: Cherie Hunt  Contact Serial # (CSN): [id number]  Patient Medical Record # (MRN): [id number]  Date of Service: Clinical Support on 6/4/2020  Physician Name: Laurie Quinn    This record has been reviewed and is being retired as incomplete by the approval of the  Medical Staff Operating Committee (MSOC)     On 9/7/2022., due to:  Unavailability of Provider     Missing Information/Comments:  []    Discharge Summary   []    DC Note/Short Stay Summary   []    ED Provider Note   []    Delivery Note   []    History & Physical   []   Operative Note   []     Procedure Note   []     Physician Order   [x]     Verbal Order   []       Other, specify:

## 2024-05-20 ENCOUNTER — TELEPHONE (OUTPATIENT)
Dept: UROLOGY | Facility: CLINIC | Age: 89
End: 2024-05-20
Payer: MEDICARE

## 2024-08-07 ENCOUNTER — OFFICE VISIT (OUTPATIENT)
Dept: UROLOGY | Facility: CLINIC | Age: 89
End: 2024-08-07
Payer: MEDICARE

## 2024-08-07 DIAGNOSIS — N39.0 RECURRENT UTI (URINARY TRACT INFECTION): Primary | ICD-10-CM

## 2024-08-07 PROCEDURE — 99213 OFFICE O/P EST LOW 20 MIN: CPT | Mod: ,,, | Performed by: NURSE PRACTITIONER

## 2024-08-07 RX ORDER — LOPERAMIDE HCL 2 MG
2 TABLET ORAL 4 TIMES DAILY PRN
COMMUNITY

## 2024-08-07 RX ORDER — SERTRALINE HYDROCHLORIDE 100 MG/1
TABLET, FILM COATED ORAL
COMMUNITY
Start: 2024-07-30

## 2024-08-07 RX ORDER — LEVETIRACETAM 1000 MG/1
TABLET ORAL
COMMUNITY
Start: 2024-07-31

## 2024-08-07 RX ORDER — CEPHALEXIN 250 MG/1
250 CAPSULE ORAL DAILY
Qty: 30 CAPSULE | Refills: 11 | Status: SHIPPED | OUTPATIENT
Start: 2024-08-07 | End: 2025-08-07

## 2024-08-07 RX ORDER — MEMANTINE HYDROCHLORIDE 5 MG/1
TABLET ORAL
COMMUNITY
Start: 2024-07-15

## 2024-08-07 RX ORDER — APIXABAN 5 MG/1
TABLET, FILM COATED ORAL
COMMUNITY
Start: 2024-08-05

## 2024-08-07 RX ORDER — HYOSCYAMINE SULFATE 0.125 MG
125 TABLET ORAL EVERY 4 HOURS PRN
COMMUNITY

## 2024-08-07 RX ORDER — DILTIAZEM HYDROCHLORIDE 180 MG/1
CAPSULE, COATED, EXTENDED RELEASE ORAL
COMMUNITY
Start: 2024-07-22

## 2024-08-07 RX ORDER — BUSPIRONE HYDROCHLORIDE 7.5 MG/1
TABLET ORAL
COMMUNITY
Start: 2024-07-15

## 2024-08-16 RX ORDER — NITROFURANTOIN 25; 75 MG/1; MG/1
100 CAPSULE ORAL 2 TIMES DAILY
Qty: 14 CAPSULE | Refills: 0 | Status: SHIPPED | OUTPATIENT
Start: 2024-08-16 | End: 2024-08-23

## 2025-08-07 ENCOUNTER — OFFICE VISIT (OUTPATIENT)
Dept: UROLOGY | Facility: CLINIC | Age: 89
End: 2025-08-07
Payer: MEDICARE

## 2025-08-07 VITALS
DIASTOLIC BLOOD PRESSURE: 70 MMHG | SYSTOLIC BLOOD PRESSURE: 138 MMHG | OXYGEN SATURATION: 93 % | WEIGHT: 186.06 LBS | HEIGHT: 63 IN | HEART RATE: 88 BPM | BODY MASS INDEX: 32.97 KG/M2

## 2025-08-07 DIAGNOSIS — R82.90 ABNORMAL URINALYSIS: ICD-10-CM

## 2025-08-07 DIAGNOSIS — N39.0 RECURRENT UTI (URINARY TRACT INFECTION): Primary | ICD-10-CM

## 2025-08-07 LAB
BILIRUBIN, UA POC OHS: NEGATIVE
BLOOD, UA POC OHS: ABNORMAL
CLARITY, UA POC OHS: ABNORMAL
COLOR, UA POC OHS: ABNORMAL
GLUCOSE, UA POC OHS: NEGATIVE
KETONES, UA POC OHS: NEGATIVE
LEUKOCYTES, UA POC OHS: ABNORMAL
NITRITE, UA POC OHS: NEGATIVE
PH, UA POC OHS: 7.5
PROTEIN, UA POC OHS: >=300
SPECIFIC GRAVITY, UA POC OHS: 1.02
UROBILINOGEN, UA POC OHS: 1

## 2025-08-07 PROCEDURE — 99213 OFFICE O/P EST LOW 20 MIN: CPT | Mod: S$PBB,,, | Performed by: NURSE PRACTITIONER

## 2025-08-07 RX ORDER — CEFDINIR 300 MG/1
300 CAPSULE ORAL 2 TIMES DAILY
Qty: 14 CAPSULE | Refills: 0 | Status: SHIPPED | OUTPATIENT
Start: 2025-08-07 | End: 2025-08-14

## 2025-08-07 RX ORDER — CEPHALEXIN 250 MG/1
250 CAPSULE ORAL DAILY
Qty: 30 CAPSULE | Refills: 11 | Status: SHIPPED | OUTPATIENT
Start: 2025-08-07 | End: 2026-08-07

## 2025-08-07 NOTE — PROGRESS NOTES
Subjective:       Patient ID: Cherie Hunt is a 89 y.o. female.    Chief Complaint: No chief complaint on file.      HPI: 81-year-old female, established patient, presents for yearly visit.    Patient is in [facility] nursing home facility.    She has a history of frequent UTIs.  We had the patient on Keflex 250 mg daily for suppression therapy.    Caretaker states patient is not on Keflex daily at this time.  She states when she was in the hospital they had stopped that medication.    She was recently in the hospital around 07/04/2025 due to a UTI.    At this time she denies any pain or burning with urination.  Denies any odor to the urine.  Denies any fever or body aches.  Denies seeing any blood in urine.    No other urinary complaints at this time.         Past Medical History:   Past Medical History:   Diagnosis Date    A-fib     Fractured hip     Hematuria     Hypertension     Hypothyroidism     Nephrolithiasis     Seizures        Past Surgical Historical:   Past Surgical History:   Procedure Laterality Date    BLADDER SURGERY      bladder tact X2    HERNIA REPAIR      TONSILLECTOMY          Medications:   Medication List with Changes/Refills   New Medications    CEFDINIR (OMNICEF) 300 MG CAPSULE    Take 1 capsule (300 mg total) by mouth 2 (two) times daily. for 7 days   Current Medications    ACARBOSE (PRECOSE) 25 MG TAB    Take 25 mg by mouth 3 (three) times daily with meals.    BUSPIRONE (BUSPAR) 7.5 MG TABLET        CHOLECALCIFEROL, VITAMIN D3, (VITAMIN D3) 25 MCG (1,000 UNIT) CAPSULE    Take 1,000 Units by mouth once daily.    DILTIAZEM (CARDIZEM CD) 180 MG 24 HR CAPSULE        DILTIAZEM (CARDIZEM CD) 240 MG 24 HR CAPSULE        ELIQUIS 2.5 MG TAB        ELIQUIS 5 MG TAB        ESTRADIOL (ESTRACE) 0.01 % (0.1 MG/GRAM) VAGINAL CREAM    Place 1 g vaginally once daily for 30 days, THEN 1 g 3 (three) times a week.    HYOSCYAMINE (ANASPAZ,LEVSIN) 0.125 MG TAB    Take 125 mcg by mouth every 4 (four) hours  as needed.    LAMOTRIGINE (LAMICTAL) 25 MG TABLET        LEVETIRACETAM (KEPPRA) 1000 MG TABLET        LEVETIRACETAM (KEPPRA) 500 MG TAB        LOPERAMIDE (IMODIUM A-D) 2 MG TAB    Take 2 mg by mouth 4 (four) times daily as needed.    LORAZEPAM (ATIVAN) 0.5 MG TABLET        MEMANTINE (NAMENDA) 5 MG TAB        METOPROLOL TARTRATE (LOPRESSOR) 50 MG TABLET        SERTRALINE (ZOLOFT) 100 MG TABLET        SERTRALINE (ZOLOFT) 25 MG TABLET        SOLIFENACIN (VESICARE) 5 MG TABLET    Take 1 tablet (5 mg total) by mouth once daily.    TAMSULOSIN (FLOMAX) 0.4 MG CAP        TRAVOPROST (TRAVATAN Z) 0.004 % OPHTHALMIC SOLUTION        TRAZODONE (DESYREL) 50 MG TABLET       Changed and/or Refilled Medications    Modified Medication Previous Medication    CEPHALEXIN (KEFLEX) 250 MG CAPSULE cephALEXin (KEFLEX) 250 MG capsule       Take 1 capsule (250 mg total) by mouth Daily.    Take 1 capsule (250 mg total) by mouth Daily.        Past Social History: Social History[1]    Allergies: Review of patient's allergies indicates:  No Known Allergies     Family History:   Family History   Problem Relation Name Age of Onset    Seizures Mother      Cancer Other      Breast cancer Other          Review of Systems:  Review of Systems   Constitutional:  Negative for activity change and appetite change.   HENT:  Negative for congestion and dental problem.    Respiratory:  Negative for chest tightness and shortness of breath.    Cardiovascular:  Negative for chest pain.   Gastrointestinal:  Negative for abdominal distention.   Genitourinary:  Negative for decreased urine volume, difficulty urinating, dyspareunia, dysuria, enuresis, flank pain, frequency, genital sores, hematuria, pelvic pain and urgency.   Musculoskeletal:  Negative for back pain and neck pain.   Allergic/Immunologic: Negative for immunocompromised state.   Neurological:  Negative for dizziness.   Hematological:  Negative for adenopathy.   Psychiatric/Behavioral:  Negative for  agitation, behavioral problems and confusion.        Physical Exam:  Physical Exam  Vitals and nursing note reviewed.   Constitutional:       Appearance: She is well-developed.   HENT:      Head: Normocephalic.   Cardiovascular:      Rate and Rhythm: Normal rate and regular rhythm.      Heart sounds: Normal heart sounds.   Pulmonary:      Effort: Pulmonary effort is normal.      Breath sounds: Normal breath sounds.   Abdominal:      General: Bowel sounds are normal.      Palpations: Abdomen is soft.   Skin:     General: Skin is warm and dry.   Neurological:      Mental Status: She is alert and oriented to person, place, and time.       Urinalysis: Large leukocytes, white blood cells too numerous to count, bacteria +4.  Large blood, red blood cells too numerous to count.    Assessment/Plan:   Recurrent UTI/abnormal urinalysis:  Will start patient on Omnicef for 7 days.  Will culture the urine.  Will change biotics if indicated.    Will restart patient on Keflex 250 mg daily.    Patient should do a repeat UA 1 week after completion of antibiotics.    Will plan follow-up in 1 year, sooner if needed  Problem List Items Addressed This Visit       Recurrent UTI (urinary tract infection) - Primary    Relevant Medications    cephALEXin (KEFLEX) 250 MG capsule    Other Relevant Orders    POCT Urinalysis(Instrument) (Completed)     Other Visit Diagnoses         Abnormal urinalysis        Relevant Medications    cefdinir (OMNICEF) 300 MG capsule    Other Relevant Orders    Urine culture                      [1]   Social History  Socioeconomic History    Marital status:    Tobacco Use    Smoking status: Never     Passive exposure: Never    Smokeless tobacco: Never   Substance and Sexual Activity    Alcohol use: Not Currently    Drug use: Never    Sexual activity: Not Currently     Partners: Male     Social Drivers of Health     Financial Resource Strain: Patient Unable To Answer (3/31/2025)    Received from [facility] Subsidiaries and Affiliates    Overall Financial Resource Strain (CARDIA)     Difficulty of Paying Living Expenses: Patient unable to answer   Food Insecurity: Patient Unable To Answer (3/31/2025)    [facility]    Hunger Vital Sign     Worried About Running Out of Food in the Last Year: Patient unable to answer     Ran Out of Food in the Last Year: Patient unable to answer   Transportation Needs: Patient Unable To Answer (3/31/2025)    [facility]    PRAPARE - Transportation     Lack of Transportation (Medical): Patient unable to answer     Lack of Transportation (Non-Medical): Patient unable to answer   Housing Stability: Low Risk  (3/31/2025)    [facility]    Housing Stability Vital Sign     Unable to Pay for Housing in the Last Year: No     Number of Times Moved in the Last Year: 0     Homeless in the Last Year: No

## 2025-08-08 LAB — URINE CULTURE, ROUTINE: NORMAL

## 2025-08-15 ENCOUNTER — RESULTS FOLLOW-UP (OUTPATIENT)
Dept: UROLOGY | Facility: CLINIC | Age: 89
End: 2025-08-15
Payer: MEDICARE

## 2025-08-20 ENCOUNTER — TELEPHONE (OUTPATIENT)
Dept: UROLOGY | Facility: CLINIC | Age: 89
End: 2025-08-20
Payer: MEDICARE

## 2025-08-26 ENCOUNTER — TELEPHONE (OUTPATIENT)
Dept: UROLOGY | Facility: CLINIC | Age: 89
End: 2025-08-26
Payer: MEDICARE